# Patient Record
Sex: MALE | Race: WHITE | NOT HISPANIC OR LATINO | Employment: STUDENT | URBAN - METROPOLITAN AREA
[De-identification: names, ages, dates, MRNs, and addresses within clinical notes are randomized per-mention and may not be internally consistent; named-entity substitution may affect disease eponyms.]

---
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
## 2022-04-28 ENCOUNTER — OFFICE VISIT (OUTPATIENT)
Dept: URGENT CARE | Facility: CLINIC | Age: 22
End: 2022-04-28
Payer: COMMERCIAL

## 2022-04-28 VITALS
SYSTOLIC BLOOD PRESSURE: 133 MMHG | BODY MASS INDEX: 25.11 KG/M2 | WEIGHT: 160 LBS | HEIGHT: 67 IN | TEMPERATURE: 99 F | OXYGEN SATURATION: 99 % | RESPIRATION RATE: 18 BRPM | HEART RATE: 83 BPM | DIASTOLIC BLOOD PRESSURE: 72 MMHG

## 2022-04-28 DIAGNOSIS — R51.9 NONINTRACTABLE EPISODIC HEADACHE, UNSPECIFIED HEADACHE TYPE: Primary | ICD-10-CM

## 2022-04-28 PROCEDURE — 3078F DIAST BP <80 MM HG: CPT | Mod: CPTII,S$GLB,, | Performed by: PHYSICIAN ASSISTANT

## 2022-04-28 PROCEDURE — 99203 PR OFFICE/OUTPT VISIT, NEW, LEVL III, 30-44 MIN: ICD-10-PCS | Mod: S$GLB,,, | Performed by: PHYSICIAN ASSISTANT

## 2022-04-28 PROCEDURE — 3078F PR MOST RECENT DIASTOLIC BLOOD PRESSURE < 80 MM HG: ICD-10-PCS | Mod: CPTII,S$GLB,, | Performed by: PHYSICIAN ASSISTANT

## 2022-04-28 PROCEDURE — 3075F SYST BP GE 130 - 139MM HG: CPT | Mod: CPTII,S$GLB,, | Performed by: PHYSICIAN ASSISTANT

## 2022-04-28 PROCEDURE — 1160F RVW MEDS BY RX/DR IN RCRD: CPT | Mod: CPTII,S$GLB,, | Performed by: PHYSICIAN ASSISTANT

## 2022-04-28 PROCEDURE — 1160F PR REVIEW ALL MEDS BY PRESCRIBER/CLIN PHARMACIST DOCUMENTED: ICD-10-PCS | Mod: CPTII,S$GLB,, | Performed by: PHYSICIAN ASSISTANT

## 2022-04-28 PROCEDURE — 1159F PR MEDICATION LIST DOCUMENTED IN MEDICAL RECORD: ICD-10-PCS | Mod: CPTII,S$GLB,, | Performed by: PHYSICIAN ASSISTANT

## 2022-04-28 PROCEDURE — 99203 OFFICE O/P NEW LOW 30 MIN: CPT | Mod: S$GLB,,, | Performed by: PHYSICIAN ASSISTANT

## 2022-04-28 PROCEDURE — 3008F PR BODY MASS INDEX (BMI) DOCUMENTED: ICD-10-PCS | Mod: CPTII,S$GLB,, | Performed by: PHYSICIAN ASSISTANT

## 2022-04-28 PROCEDURE — 1159F MED LIST DOCD IN RCRD: CPT | Mod: CPTII,S$GLB,, | Performed by: PHYSICIAN ASSISTANT

## 2022-04-28 PROCEDURE — 3075F PR MOST RECENT SYSTOLIC BLOOD PRESS GE 130-139MM HG: ICD-10-PCS | Mod: CPTII,S$GLB,, | Performed by: PHYSICIAN ASSISTANT

## 2022-04-28 PROCEDURE — 3008F BODY MASS INDEX DOCD: CPT | Mod: CPTII,S$GLB,, | Performed by: PHYSICIAN ASSISTANT

## 2022-04-28 RX ORDER — BUTALBITAL, ACETAMINOPHEN AND CAFFEINE 50; 325; 40 MG/1; MG/1; MG/1
1 TABLET ORAL EVERY 4 HOURS PRN
Qty: 30 TABLET | Refills: 0 | Status: SHIPPED | OUTPATIENT
Start: 2022-04-28 | End: 2022-05-28

## 2022-04-28 NOTE — LETTER
April 28, 2022      [facility]  [address], [address]  [facility] [place] [address]  Phone: [phone]  Fax: [phone]       Patient: Tyrel Pitts   YOB: 2000  Date of Visit: 04/28/2022    To Whom It May Concern:    nAish Pitts  was at [facility] on 04/28/2022. The patient may return to work/school on 4/29/2022 with no restrictions. If you have any questions or concerns, or if I can be of further assistance, please do not hesitate to contact me.    Sincerely,    Renetta Ryan PA-C

## 2022-04-28 NOTE — LETTER
April 28, 2022      [facility]  [address], [address]  [facility] [address]  Phone: [phone]  Fax: [phone]       Patient: Tyrel Pitts   YOB: 2000  Date of Visit: 04/28/2022    To Whom It May Concern:    Anish Pitts  was at [facility] on 04/28/2022. The patient may return to work/school on 3/28/2022 with no restrictions. If you have any questions or concerns, or if I can be of further assistance, please do not hesitate to contact me.    Sincerely,    Renetta Ryan PA-C

## 2022-04-28 NOTE — PROGRESS NOTES
"Subjective:       Patient ID: Tyrel Pitts is a 21 y.o. male.    Vitals:  height is 5' 7" (1.702 m) and weight is 72.6 kg (160 lb). His temperature is 98.6 °F (37 °C). His blood pressure is 133/72 and his pulse is 83. His respiration is 18 and oxygen saturation is 99%.     Chief Complaint: Headache    Pt states he gets headaches about once a week and about once a month it's migraine-like headaches. Pt states he had a migraine the other day but his head still hurts a little. Pt has not discussed this with his primary care yet because he hasn't been able to get back in the office. He states that when he gets severe headaches he has nausea but not vomiting. He also states that he is sensitive to sounds, has some ear discomfort, and gets dizzy when he has severe headaches. Today he is feeling better, but his headache has not completely resolved.     Headache   This is a new problem. The current episode started in the past 7 days. The problem occurs constantly. The problem has been gradually improving. The pain quality is similar to prior headaches. The pain is at a severity of 8/10 (on-set). The pain is moderate. Associated symptoms include dizziness and ear pain. Pertinent negatives include no abdominal pain, abnormal behavior, anorexia, back pain, blurred vision, coughing, drainage, eye pain, eye redness, eye watering, facial sweating, fever, hearing loss, insomnia, loss of balance, muscle aches, nausea, neck pain, numbness, phonophobia, photophobia, rhinorrhea, scalp tenderness, seizures, sinus pressure, sore throat, swollen glands, tingling, tinnitus, visual change, vomiting, weakness or weight loss. Nothing aggravates the symptoms. He has tried NSAIDs for the symptoms. The treatment provided mild relief. There is no history of cancer, cluster headaches, hypertension, immunosuppression, migraines in the family, obesity, pseudotumor cerebri, recent head traumas, sinus disease or TMJ.       Constitution: Negative " for fever.   HENT: Positive for ear pain. Negative for tinnitus, hearing loss, sinus pressure and sore throat.    Neck: Negative for neck pain.   Eyes: Negative for eye pain, eye redness, photophobia and blurred vision.   Respiratory: Negative for cough.    Gastrointestinal: Negative for abdominal pain, nausea and vomiting.   Musculoskeletal: Negative for back pain.   Neurological: Positive for dizziness and headaches. Negative for loss of balance, numbness and seizures.   Psychiatric/Behavioral: The patient does not have insomnia.        Objective:      Physical Exam   Constitutional: He is oriented to person, place, and time. He appears well-developed. He is cooperative.  Non-toxic appearance. He does not appear ill. No distress.   HENT:   Head: Normocephalic and atraumatic.   Ears:   Right Ear: Hearing, tympanic membrane, external ear and ear canal normal.   Left Ear: Hearing, tympanic membrane, external ear and ear canal normal.   Nose: Nose normal. No mucosal edema, rhinorrhea or nasal deformity. No epistaxis. Right sinus exhibits no maxillary sinus tenderness and no frontal sinus tenderness. Left sinus exhibits no maxillary sinus tenderness and no frontal sinus tenderness.   Mouth/Throat: Uvula is midline, oropharynx is clear and moist and mucous membranes are normal. No trismus in the jaw. Normal dentition. No uvula swelling. No posterior oropharyngeal erythema.   Eyes: Conjunctivae and lids are normal. Right eye exhibits no discharge. Left eye exhibits no discharge. No scleral icterus.   Neck: Trachea normal and phonation normal. Neck supple.   Cardiovascular: Normal rate, regular rhythm, normal heart sounds and normal pulses.   Pulmonary/Chest: Effort normal and breath sounds normal. No respiratory distress.   Abdominal: Normal appearance.   Musculoskeletal: Normal range of motion.         General: No deformity. Normal range of motion.   Neurological: He is alert and oriented to person, place, and time. He  exhibits normal muscle tone. Coordination normal.   Skin: Skin is warm, dry, intact, not diaphoretic and not pale.   Psychiatric: His speech is normal and behavior is normal. Judgment and thought content normal.   Nursing note and vitals reviewed.         Assessment:       1. Nonintractable episodic headache, unspecified headache type          Plan:       Patient with a history of episodic migraines. Advil does help some, but is not able to fully alleviate symptoms. Encouraged patient to reach out to PCP for management of his chronic headaches. Plan as follows:   Nonintractable episodic headache, unspecified headache type  -     butalbital-acetaminophen-caffeine -40 mg (FIORICET, ESGIC) -40 mg per tablet; Take 1 tablet by mouth every 4 (four) hours as needed for Pain.  Dispense: 30 tablet; Refill: 0      Renetta Ryan [facility], PA-C  [facility]

## 2022-05-01 ENCOUNTER — TELEPHONE (OUTPATIENT)
Dept: URGENT CARE | Facility: CLINIC | Age: 22
End: 2022-05-01
Payer: COMMERCIAL

## 2022-05-01 NOTE — TELEPHONE ENCOUNTER
Attempted to contact patient as courtesy call from recent Urgent Care visit on 04.28.2022, patient did not answer, left voicemail. /shell/